# Patient Record
Sex: MALE | URBAN - METROPOLITAN AREA
[De-identification: names, ages, dates, MRNs, and addresses within clinical notes are randomized per-mention and may not be internally consistent; named-entity substitution may affect disease eponyms.]

---

## 2019-05-31 ENCOUNTER — APPOINTMENT (RX ONLY)
Dept: URBAN - METROPOLITAN AREA CLINIC 106 | Facility: CLINIC | Age: 79
Setting detail: DERMATOLOGY
End: 2019-05-31

## 2019-05-31 DIAGNOSIS — L57.0 ACTINIC KERATOSIS: ICD-10-CM

## 2019-05-31 DIAGNOSIS — Q83.3 ACCESSORY NIPPLE: ICD-10-CM

## 2019-05-31 DIAGNOSIS — L63.1 ALOPECIA UNIVERSALIS: ICD-10-CM

## 2019-05-31 PROBLEM — E13.9 OTHER SPECIFIED DIABETES MELLITUS WITHOUT COMPLICATIONS: Status: ACTIVE | Noted: 2019-05-31

## 2019-05-31 PROBLEM — J30.1 ALLERGIC RHINITIS DUE TO POLLEN: Status: ACTIVE | Noted: 2019-05-31

## 2019-05-31 PROBLEM — E78.5 HYPERLIPIDEMIA, UNSPECIFIED: Status: ACTIVE | Noted: 2019-05-31

## 2019-05-31 PROBLEM — L29.8 OTHER PRURITUS: Status: ACTIVE | Noted: 2019-05-31

## 2019-05-31 PROBLEM — L23.7 ALLERGIC CONTACT DERMATITIS DUE TO PLANTS, EXCEPT FOOD: Status: ACTIVE | Noted: 2019-05-31

## 2019-05-31 PROCEDURE — ? LIQUID NITROGEN

## 2019-05-31 PROCEDURE — 17000 DESTRUCT PREMALG LESION: CPT

## 2019-05-31 PROCEDURE — ? COUNSELING

## 2019-05-31 PROCEDURE — 99203 OFFICE O/P NEW LOW 30 MIN: CPT | Mod: 25

## 2019-05-31 ASSESSMENT — LOCATION ZONE DERM
LOCATION ZONE: EAR
LOCATION ZONE: TRUNK

## 2019-05-31 ASSESSMENT — LOCATION SIMPLE DESCRIPTION DERM
LOCATION SIMPLE: ABDOMEN
LOCATION SIMPLE: RIGHT EAR

## 2019-05-31 ASSESSMENT — LOCATION DETAILED DESCRIPTION DERM
LOCATION DETAILED: RIGHT ANTIHELIX
LOCATION DETAILED: RIGHT LATERAL ABDOMEN

## 2019-05-31 NOTE — PROCEDURE: LIQUID NITROGEN
Number Of Freeze-Thaw Cycles: 1 freeze-thaw cycle
Consent: The patient's consent was obtained including but not limited to risks of crusting, scabbing, blistering, scarring, darker or lighter pigmentary change, recurrence, incomplete removal and infection.
Render Post-Care Instructions In Note?: yes
Post-Care Instructions: I reviewed with the patient in detail post-care instructions. Patient is to wear sunprotection, and avoid picking at any of the treated lesions. Pt may apply Vaseline to crusted or scabbing areas.
Duration Of Freeze Thaw-Cycle (Seconds): 5
Detail Level: Simple
Render Note In Bullet Format When Appropriate: No